# Patient Record
Sex: MALE | Race: WHITE
[De-identification: names, ages, dates, MRNs, and addresses within clinical notes are randomized per-mention and may not be internally consistent; named-entity substitution may affect disease eponyms.]

---

## 2017-12-12 ENCOUNTER — HOSPITAL ENCOUNTER (EMERGENCY)
Dept: HOSPITAL 25 - UCEAST | Age: 27
Discharge: HOME | End: 2017-12-12
Payer: COMMERCIAL

## 2017-12-12 VITALS — DIASTOLIC BLOOD PRESSURE: 71 MMHG | SYSTOLIC BLOOD PRESSURE: 122 MMHG

## 2017-12-12 DIAGNOSIS — L03.031: Primary | ICD-10-CM

## 2017-12-12 PROCEDURE — 10060 I&D ABSCESS SIMPLE/SINGLE: CPT

## 2017-12-12 PROCEDURE — G0463 HOSPITAL OUTPT CLINIC VISIT: HCPCS

## 2017-12-12 PROCEDURE — 99212 OFFICE O/P EST SF 10 MIN: CPT

## 2018-03-03 ENCOUNTER — HOSPITAL ENCOUNTER (EMERGENCY)
Dept: HOSPITAL 25 - UCEAST | Age: 28
Discharge: HOME | End: 2018-03-03
Payer: COMMERCIAL

## 2018-03-03 VITALS — DIASTOLIC BLOOD PRESSURE: 80 MMHG | SYSTOLIC BLOOD PRESSURE: 118 MMHG

## 2018-03-03 DIAGNOSIS — R51: ICD-10-CM

## 2018-03-03 DIAGNOSIS — R22.1: Primary | ICD-10-CM

## 2018-03-03 PROCEDURE — 99211 OFF/OP EST MAY X REQ PHY/QHP: CPT

## 2018-03-03 PROCEDURE — G0463 HOSPITAL OUTPT CLINIC VISIT: HCPCS

## 2018-03-03 NOTE — UC
Headache HPI





- History Of Current Complaint


Chief Complaint: Taylor


Stated Complaint: HEADACHE, AND LUMP ON NECK


Time Seen by Provider: 03/03/18 15:18


Hx Obtained From: Patient


Onset/Duration: Sudden Onset - awoke yesterday with headache and felt lump on L 

posterior neck. no headache today but lump is still there


Pain Intensity: 0


Character: Typical Headache - resolved within hours





- Allergies/Home Medications


Allergies/Adverse Reactions: 


 Allergies











Allergy/AdvReac Type Severity Reaction Status Date / Time


 


No Known Allergies Allergy   Verified 03/03/18 14:53











Home Medications: 


 Home Medications





NK [No Home Medications Reported]  03/03/18 [History Confirmed 03/03/18]











PMH/Surg Hx/FS Hx/Imm Hx


Previously Healthy: Yes





- Surgical History


Surgical History: Yes


Surgery Procedure, Year, and Place: right wrist





- Family History


Known Family History: Positive: Hypertension


   Negative: Cardiac Disease, Diabetes





- Social History


Occupation: Employed Full-time


Lives: With Family


Alcohol Use: Occasionally


Substance Use Type: None


Smoking Status (MU): Never Smoked Tobacco


Have You Smoked in the Last Year: No


Household Exposure Type: Cigarettes





- Immunization History


Most Recent Influenza Vaccination: NOT UTD


Most Recent Tetanus Shot: NOT UTD.





Review of Systems


Constitutional: Negative


Skin: Other - acne


ENT: Negative


Respiratory: Negative


Cardiovascular: Negative


Musculoskeletal: Negative


Neurological: Negative


Psychological: Negative


Is Patient Immunocompromised?: No


All Other Systems Reviewed And Are Negative: Yes





Physical Exam


Triage Information Reviewed: Yes


Appearance: Well-Appearing, No Pain Distress, Well-Nourished


Vital Signs: 


 Initial Vital Signs











Temp  99.1 F   03/03/18 14:50


 


Pulse  54   03/03/18 14:50


 


Resp  12   03/03/18 14:50


 


BP  118/80   03/03/18 14:50


 


Pulse Ox  100   03/03/18 14:50











Vital Signs Reviewed: Yes


Eye Exam: Normal


Eyes: Positive: Conjunctiva Clear


ENT Exam: Normal


Neck: Positive: Supple, Other: - one small (6mm) round, firm tender mass L 

posterior neck in hairline. pt does have some acne lesions in area which he 

explains are "normal for him"


Respiratory Exam: Normal


Respiratory: Positive: Lungs clear


Cardiovascular Exam: Normal


Cardiovascular: Positive: RRR


Musculoskeletal Exam: Normal


Neurological Exam: Normal


Neurological: Positive: Alert


Psychological Exam: Normal


Skin: Positive: Other - no erythemic vesicals, few acne lesions face and neck





Headache Course/Dx





- Differential Dx/Diagnosis


Differential Diagnosis/HQI/PQRI: Tension Headache, Other - shingles, abscess, 

LAD


Provider Diagnoses: small skin nodule posterior neck





Discharge





- Discharge Plan


Condition: Good


Disposition: HOME


Patient Education Materials:  Lymphadenopathy (ED)


Forms:  *Work Release


Referrals: 


Benny Kc MD [Primary Care Provider] - 1 Week (if no better)


Additional Instructions: 


monitor area and return if symptoms change or worsen at any time

## 2018-03-13 ENCOUNTER — HOSPITAL ENCOUNTER (EMERGENCY)
Dept: HOSPITAL 25 - UCEAST | Age: 28
Discharge: HOME | End: 2018-03-13
Payer: COMMERCIAL

## 2018-03-13 VITALS — SYSTOLIC BLOOD PRESSURE: 127 MMHG | DIASTOLIC BLOOD PRESSURE: 82 MMHG

## 2018-03-13 DIAGNOSIS — M25.571: Primary | ICD-10-CM

## 2018-03-13 PROCEDURE — G0463 HOSPITAL OUTPT CLINIC VISIT: HCPCS

## 2018-03-13 PROCEDURE — 99211 OFF/OP EST MAY X REQ PHY/QHP: CPT

## 2018-03-13 NOTE — RAD
Indication: History of tibia and fibular fracture.



3 views of the right ankle demonstrates ankle mortise to be intact. No fracture is

identified. Calcaneus is unremarkable. When compared to previous exam of August 31, 2017

previously identified tibial fracture is no longer present.



IMPRESSION: No fracture of the right ankle is noted. Prior tibial fracture appears to be

healed.

## 2018-03-13 NOTE — RAD
Indication: Right foot pain.



3 views of the right foot demonstrates no fracture. No other bone or joint abnormality is

noted.



IMPRESSION: No fracture of the right foot is noted.

## 2018-03-13 NOTE — UC
Lower Extremity/Ankle HPI





- HPI Summary


HPI Summary: 





Pt presents with right ankle pain. He tells me that about 10 months ago he 

jumped from a height and sustained an impacted fracture of his right tibia. He 

saw Orthopedics for this and healed well. Has had no issues until about 1 week 

ago. Started having right lateral malleolus pain that was at it's worst today - 

his work sent him to be seen. Denies numbness or tingling. He is able to 

ambulate without a limp and without assistance.





- History of Current Complaint


Stated Complaint: LEG PAIN FROM INJURIES


Time Seen by Provider: 03/13/18 15:49


Hx Obtained From: Patient


Onset/Duration: Gradual Onset


Severity Initially: Moderate


Severity Currently: Moderate


Pain Intensity: 6


Pain Scale Used: 0-10 Numeric


Aggravating Factor(s): Standing


Alleviating Factor(s): Rest


Able to Bear Weight: Yes





- Allergies/Home Medications


Allergies/Adverse Reactions: 


 Allergies











Allergy/AdvReac Type Severity Reaction Status Date / Time


 


No Known Allergies Allergy   Verified 03/13/18 15:51














PMH/Surg Hx/FS Hx/Imm Hx


Previously Healthy: Yes





- Surgical History


Surgical History: Yes


Surgery Procedure, Year, and Place: right wrist





- Family History


Known Family History: Positive: Hypertension


   Negative: Cardiac Disease, Diabetes





- Social History


Occupation: Employed Full-time


Lives: With Family


Alcohol Use: Occasionally


Substance Use Type: None


Smoking Status (MU): Never Smoked Tobacco


Have You Smoked in the Last Year: No


Household Exposure Type: Cigarettes





- Immunization History


Most Recent Influenza Vaccination: NOT UTD


Most Recent Tetanus Shot: NOT UTD.





Review of Systems


Constitutional: Negative


Skin: Negative


Respiratory: Negative


Cardiovascular: Negative


Neurovascular: Negative


Musculoskeletal: Other: - Pain right ankle


Neurological: Negative


Psychological: Negative


All Other Systems Reviewed And Are Negative: Yes





Physical Exam





- Summary


Physical Exam Summary: 





GENERAL: NAD. WDWN. No pain distress.


SKIN: No rashes, sores, ulcers, masses, lesions. 


NECK: Supple. Nontender. No lymphadenopathy. 


CHEST:  CTAB. No r/r/w. No accessory muscle use. Breathing comfortably and in 

no distress.


CV:  RRR. Without m/r/g. Pulses intact PT and DP. Brisk cap refill.


MSK: Mild TTP right lateral malleolus. FROM. Strength 5/5. No edema or obvious 

bony deformities. Negative talar tilt. No increased laxity. Negative Blum 

test.


NEURO: Alert. Sensations intact and symmetric B/L LEs


PSYCH: Age appropriate behavior.


Triage Information Reviewed: Yes





Lower Extremity Course/Dx





- Course


Course Of Treatment: XR: IMPRESSION: No fracture of the right foot is noted. 

IMPRESSION: No fracture of the right ankle is noted. Prior tibial fracture 

appears to be.  healed.  Declined crutches. Advised to take ibuprofen and f/u 

with Dr. Kc whom he saw for his previous fx.





- Differential Dx/Diagnosis


Provider Diagnoses: Right ankle pain





Discharge





- Discharge Plan


Condition: Stable


Disposition: HOME


Forms:  *Work Release


Referrals: 


Benyn Kc MD [Primary Care Provider] - If Needed


Additional Instructions: 


If you develop a fever, shortness of breath, chest pain, new or worsening 

symptoms - please call your PCP or go to the ED.


 


1) Please schedule a follow up appointment with Dr. Kc for further 

evaluation of your right ankle pain.

## 2018-07-30 ENCOUNTER — HOSPITAL ENCOUNTER (EMERGENCY)
Dept: HOSPITAL 25 - ED | Age: 28
Discharge: HOME | End: 2018-07-30
Payer: COMMERCIAL

## 2018-07-30 VITALS — DIASTOLIC BLOOD PRESSURE: 65 MMHG | SYSTOLIC BLOOD PRESSURE: 124 MMHG

## 2018-07-30 DIAGNOSIS — S61.242A: Primary | ICD-10-CM
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
## 2018-07-30 PROCEDURE — 99282 EMERGENCY DEPT VISIT SF MDM: CPT

## 2018-07-30 PROCEDURE — 90715 TDAP VACCINE 7 YRS/> IM: CPT

## 2018-07-30 PROCEDURE — 90471 IMMUNIZATION ADMIN: CPT

## 2018-07-30 NOTE — ED
Upper Extremity Pain





- HPI Summary


HPI Summary: 





Complains of fishhook in finger tip pad of right third digit today.  Denies 

loss of sensation or function.  No anti-coag.  Medical history is none.





- History of Current Complaint


Chief Complaint: EDExtremityUpper


Stated Complaint: RT FINGER-HOOK IN MIDDLE FINGER


Time Seen by Provider: 07/30/18 19:37


Hx Obtained From: Patient


Severity Initially: Moderate


Severity Currently: Moderate


Pain Location: Finger


Character: Sharp, Aching, Throbbing


Aggravating Factor(s): Movement





- Allergies/Home Medications


Allergies/Adverse Reactions: 


 Allergies











Allergy/AdvReac Type Severity Reaction Status Date / Time


 


No Known Allergies Allergy   Verified 03/13/18 15:51














PMH/Surg Hx/FS Hx/Imm Hx


Endocrine/Hematology History: 


   Denies: Hx Anticoagulant Therapy, Hx Diabetes, Hx Thyroid Disease


Cardiovascular History: 


   Denies: Hx Hypertension


Respiratory History: 


   Denies: Hx Asthma, Hx Chronic Obstructive Pulmonary Disease (COPD)


GI History: 


   Denies: Hx Ulcer


 History: 


   Denies: Hx Dialysis


Neurological History: 


   Denies: Hx CVA


Psychiatric History: Reports: Hx Anxiety - hx of cutting, Hx Substance Abuse





- Cancer History


Cancer Type, Location and Year: anxiety





- Surgical History


Surgery Procedure, Year, and Place: right wrist





- Immunization History


Date of Tetanus Vaccine: 07/30/18


Infectious Disease History: No


Infectious Disease History: 


   Denies: Hx Clostridium Difficile, Hx Hepatitis, Hx Human Immunodeficiency 

Virus (HIV), Hx of Known/Suspected MRSA, Hx Shingles, Hx Tuberculosis, Hx Known/

Suspected VRE, Hx Known/Suspected VRSA, History Other Infectious Disease, 

Traveled Outside the [place] in Last 30 Days





- Family History


Known Family History: Positive: Hypertension


   Negative: Cardiac Disease, Diabetes





- Social History


Alcohol Use: Occasionally


Hx Substance Use: Yes


Substance Use Type: Reports: None


Smoking Status (MU): Never Smoked Tobacco


Have You Smoked in the Last Year: No





Review of Systems


Constitutional: Negative


Eyes: Negative


ENT: Negative


Cardiovascular: Negative


Respiratory: Negative


Gastrointestinal: Negative


Genitourinary: Negative


Musculoskeletal: Negative


Skin: Other


Neurological: Negative


Psychological: Normal


All Other Systems Reviewed And Are Negative: Yes





Physical Exam





- Summary


Physical Exam Summary: 





[place] from trouble hook lodged in superficial fingertip pad of the right 

third digit.  PMS intact distally


Triage Information Reviewed: Yes


Vital Signs On Initial Exam: 


 Initial Vitals











Temp Pulse Resp BP Pulse Ox


 


 99.2 F   53   18   139/76   100 


 


 07/30/18 19:14  07/30/18 19:14  07/30/18 19:14  07/30/18 19:14  07/30/18 19:14











Vital Signs Reviewed: Yes


Appearance: Positive: Well-Appearing


Skin: Positive: Warm


Head/Face: Positive: Normal Head/Face Inspection


Eyes: Positive: Normal


Neck: Positive: Supple


Respiratory/Lung Sounds: Positive: Clear to Auscultation


Cardiovascular: Positive: Normal


Abdomen Description: Positive: Nontender


Musculoskeletal: Positive: Normal


Neurological: Positive: Normal


Psychiatric: Positive: Normal


AVPU Assessment: Alert





- [place] Coma Scale


Best Eye Response: 4 - Spontaneous


Best Motor Response: 6 - Obeys Commands


Best Verbal Response: 5 - Oriented


Coma Scale Total: 15





Diagnostics





- Vital Signs


 Vital Signs











  Temp Pulse Resp BP Pulse Ox


 


 07/30/18 19:14  99.2 F  53  18  139/76  100














- Laboratory


Lab Statement: Any lab studies that have been ordered have been reviewed, and 

results considered in the medical decision making process.





Course/Dx





- Course


Course Of Treatment: Complains of fishhook in finger tip pad of right third 

digit today.  Denies loss of sensation or function.  No anti-coag.  Medical 

history is none.  [place] from trouble hook lodged in superficial fingertip 

pad of the right third digit.  PMS intact distally.  Booster shot given.  

[place] removed successfully.  Patient started on Keflex here in the ED.  Rx 

for same.  Ibuprofen for pain.





- Diagnoses


Provider Diagnoses: 


 Fish hook injury of finger








Discharge





- Sign-Out/Discharge


Documenting (check all that apply): Patient Departure





- Discharge Plan


Condition: Stable


Disposition: HOME


Prescriptions: 


Cephalexin CAP* [Keflex CAP*] 500 mg PO TID 7 Days #21 cap


Patient Education Materials:  Soft Tissue Foreign Body (ED)


Referrals: 


Benny Kc MD [Primary Care Provider] - 


Additional Instructions: 


Take antibiotics as directed.  May wash wound with warm running water and soap.

  Keep protected when not washing.  Do not submerge underwater as in swimming 

for 4 days.  Return to the ED for any new or worsening symptoms





- Billing Disposition and Condition


Condition: STABLE


Disposition: Home

## 2018-08-28 ENCOUNTER — HOSPITAL ENCOUNTER (EMERGENCY)
Dept: HOSPITAL 25 - UCEAST | Age: 28
Discharge: HOME | End: 2018-08-28
Payer: COMMERCIAL

## 2018-08-28 VITALS — SYSTOLIC BLOOD PRESSURE: 132 MMHG | DIASTOLIC BLOOD PRESSURE: 78 MMHG

## 2018-08-28 DIAGNOSIS — S29.011A: ICD-10-CM
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
## 2018-08-28 DIAGNOSIS — R03.0: ICD-10-CM

## 2018-08-28 DIAGNOSIS — R07.89: Primary | ICD-10-CM
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
## 2018-08-28 PROCEDURE — 71046 X-RAY EXAM CHEST 2 VIEWS: CPT

## 2018-08-28 PROCEDURE — 93005 ELECTROCARDIOGRAM TRACING: CPT

## 2018-08-28 PROCEDURE — G0463 HOSPITAL OUTPT CLINIC VISIT: HCPCS

## 2018-08-28 PROCEDURE — 99211 OFF/OP EST MAY X REQ PHY/QHP: CPT

## 2018-08-28 NOTE — UC
Cardiac HPI





- History of Current Complaint


Stated Complaint: CHEST PAIN


Time Seen by Provider: 08/28/18 18:16





- Allergy/Home Medications


Allergies/Adverse Reactions: 


 Allergies











Allergy/AdvReac Type Severity Reaction Status Date / Time


 


No Known Allergies Allergy   Verified 03/13/18 15:51














PMH/Surg Hx/FS Hx/Imm Hx


Other History Of: 


   Negative For: Anticoagulant Therapy





- Surgical History


Surgical History: Yes


Surgery Procedure, Year, and Place: right wrist





- Family History


Known Family History: Positive: Hypertension


   Negative: Cardiac Disease, Diabetes





- Social History


Alcohol Use: Occasionally


Substance Use Type: None


Smoking Status (MU): Never Smoked Tobacco


Have You Smoked in the Last Year: No


Household Exposure Type: Cigarettes





- Immunization History


Most Recent Influenza Vaccination: NOT UTD


Most Recent Tetanus Shot: NOT UTD.





Discharge





- Discharge Plan


Referrals: 


Benny Kc MD [Primary Care Provider] -

## 2018-08-28 NOTE — UC
Cardiac HPI





- HPI Summary


HPI Summary: 





27 yo male presents with intermittent left sided chest pain over the last 3 

weeks. He tells me that at work he does a lot of heavy and overhead lifting. He 

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 

the last 3 weeks has felt "pulls" and "aches" in his left anterior chest. 

Thinks it may be a muscle. Says that he can reproduce his pain if he "pushes on 

it hard enough". He has not taken anything OTC for this discomfort. Pain does 

not change with meals. Denies fever, chills, SOB, AMARO, palpitations, cough, 

abdominal pain, n/v/d/c. No cardiac fam hx except HTN.





- History of Current Complaint


Stated Complaint: CHEST PAIN


Time Seen by Provider: 08/28/18 18:16


Hx Obtained From: Patient


Onset/Duration: Gradual Onset


Timing: Intermittent Episodes Lasting: - minutes


Initial Severity: Mild


Current Severity: Mild


Pain Intensity: 2


Chest Pain Location: Left Anterior


Character: Sharp/Stabbing





- Allergy/Home Medications


Allergies/Adverse Reactions: 


 Allergies











Allergy/AdvReac Type Severity Reaction Status Date / Time


 


No Known Allergies Allergy   Verified 03/13/18 15:51














PMH/Surg Hx/FS Hx/Imm Hx





- Additional Past Medical History


Additional PMH: 





None


Other History Of: 


   Negative For: Anticoagulant Therapy





- Surgical History


Surgical History: Yes


Surgery Procedure, Year, and Place: right wrist





- Family History


Known Family History: Positive: Hypertension


   Negative: Cardiac Disease, Diabetes





- Social History


Occupation: Employed Full-time


Lives: With Family


Alcohol Use: Occasionally


Substance Use Type: None


Smoking Status (MU): Never Smoked Tobacco


Have You Smoked in the Last Year: No


Household Exposure Type: Cigarettes





- Immunization History


Most Recent Influenza Vaccination: NOT UTD


Most Recent Tetanus Shot: NOT UTD.





Review of Systems


Constitutional: Negative


Skin: Negative


Respiratory: Negative


Cardiovascular: Chest Pain


Gastrointestinal: Negative


Neurovascular: Negative


Neurological: Negative


Psychological: Negative


All Other Systems Reviewed And Are Negative: Yes





Physical Exam





- Summary


Physical Exam Summary: 





GENERAL: NAD. WDWN. No pain distress.


SKIN: No rashes, sores, lesions, or open wounds.


NECK: Supple. Nontender. No lymphadenopathy. 


CHEST:  CTAB. No r/r/w. No accessory muscle use. Breathing comfortably and in 

no distress.


CV:  RRR. Without m/r/g. Pulses intact. Cap refill <2seconds


ABDOMEN:  Soft. NTTP. No distention or guarding. No CVA tenderness. Bowel 

sounds present


MSK: FROM b/l UEs with mild reproduction of pain with left shoulder adduction 

against force. Left anterior chest NTTP.


NEURO: Alert. CN II-XII grossly intact.


PSYCH: Age appropriate behavior.


Triage Information Reviewed: Yes


Vital Signs: 





Vital Signs:











Temp Pulse Resp BP Pulse Ox


 


 978.0 F   60   16   132/78   96 


 


 08/28/18 18:18  08/28/18 18:18  08/28/18 18:18  08/28/18 18:18  08/28/18 18:18











Vital Signs Reviewed: Yes





- Assessment/Plan


Course Of Treatment: EKG sinus bradycardia at 56 bpm. No ST changes as read by 

Dr. Ross. Compared to EKG from 5/25/16.  CXR obtained, no radiologist 

reading after 1800 therefore wet read is negative for acute process. I suspect 

his pain is due to a muscle strain and have advised him to try OTC ibuprofen 

and apply heat to the area to see if this improves his pain. If he develops any 

worsening or new symptoms to be further evaluated in the ED. Pt was agreeable 

to this.





- Clinical Impression


Provider Diagnoses: Chest pain.  Muscle strain





Discharge





- Sign-Out/Discharge


Documenting (check all that apply): Patient Departure


All imaging exams completed and their final reports reviewed: No





- Discharge Plan


Condition: Stable


Disposition: HOME


Patient Education Materials:  Muscle Strain (DC)


Referrals: 


Benny Kc MD [Primary Care Provider] - 


Additional Instructions: 


If you develop a fever, shortness of breath, chest pain, new or worsening 

symptoms - please call your PCP or go to the ED.


 





Your blood pressure was mildly elevated at todays visit. Please see your 

primary provider within 4 weeks for recheck and re-evaluation.








1) Please try over-the-counter ibuprofen 600mg every 6-8hours as needed for 

pain to see if this improves your pain.


2) If you develop shortness of breath, increasing chest pain, or dizziness - 

please call 911 or go to the ER.





- Billing Disposition and Condition


Condition: STABLE


Disposition: Home





- Attestation Statements


Provider Attestation: 





Per institutional requirements, I have reviewed the chart, however, I was not 

consulted specifically or made aware of this patient by the midlevel provider.  

I did not personally evaluate, interact with , or disposition  this patient.

## 2018-08-29 NOTE — RAD
INDICATION: LEFT anterior upper dull chest pain for 2 weeks.



COMPARISON: May 25, 2016



TECHNIQUE:  Dual energy PA and routine lateral views of the chest were obtained.



REPORT: Lung volumes appear elevated. No focal pulmonary lesion, compelling alveolar

consolidation, pleural effusion, pneumothorax. The heart, pulmonary vasculature, and

mediastinal contours are unremarkable. Unremarkable soft tissue contours and osseous

structures. 



IMPRESSION: 

#. Elevated lung volumes may reflect obstructive lung disease or simply exuberant

inspiratory effort for examination. 

#. No evidence for acute intrathoracic disease.



R0

## 2018-08-30 ENCOUNTER — HOSPITAL ENCOUNTER (EMERGENCY)
Dept: HOSPITAL 25 - ED | Age: 28
Discharge: HOME | End: 2018-08-30
Payer: COMMERCIAL

## 2018-08-30 VITALS — SYSTOLIC BLOOD PRESSURE: 141 MMHG | DIASTOLIC BLOOD PRESSURE: 84 MMHG
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
## 2018-08-30 DIAGNOSIS — S01.04XA: Primary | ICD-10-CM
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
## 2018-08-30 PROCEDURE — 99281 EMR DPT VST MAYX REQ PHY/QHP: CPT

## 2018-08-30 NOTE — ED
Skin Complaint





- HPI Summary


HPI Summary: 


This patient is a 28 year old M presenting to [facility] with a chief complaint of a 

fishing lure in scalp that began at approximately at 1730. The patient rates 

the pain 0/10 in severity. Symptoms aggravated by nothing. Symptoms alleviated 

by nothing.








- History of Current Complaint


Chief Complaint: EDGeneral


Time Seen by Provider: 08/30/18 19:28


Stated Complaint: FISHING HOOK IN BACK OF HEAD


Hx Obtained From: Patient


Onset/Duration: Started Hours Ago, Still Present


Skin Exposure Onset/Duration: Hours Ago


Timing: Constant


Onset Severity: Mild


Current Severity: Mild


Pain Intensity: 0


Pain Scale Used: 0-10 Numeric


Skin Location: Other: - Back of head


Aggravating Symptom(s): Nothing


Alleviating Symptom(s): Nothing





- Allergy/Home Medications


Allergies/Adverse Reactions: 


 Allergies











Allergy/AdvReac Type Severity Reaction Status Date / Time


 


No Known Allergies Allergy   Verified 08/30/18 19:24














PMH/Surg Hx/FS Hx/Imm Hx


Previously Healthy: No


Endocrine/Hematology History: 


   Denies: Hx Anticoagulant Therapy, Hx Diabetes, Hx Thyroid Disease


Cardiovascular History: 


   Denies: Hx Hypertension


Respiratory History: 


   Denies: Hx Asthma, Hx Chronic Obstructive Pulmonary Disease (COPD)


GI History: 


   Denies: Hx Ulcer


 History: 


   Denies: Hx Dialysis


Neurological History: 


   Denies: Hx CVA


Psychiatric History: Reports: Hx Anxiety - hx of cutting, Hx Substance Abuse





- Cancer History


Cancer Type, Location and Year: anxiety





- Surgical History


Surgery Procedure, Year, and Place: right wrist





- Immunization History


Date of Tetanus Vaccine: 07/30/18


Infectious Disease History: No


Infectious Disease History: 


   Denies: Hx Clostridium Difficile, Hx Hepatitis, Hx Human Immunodeficiency 

Virus (HIV), Hx of Known/Suspected MRSA, Hx Shingles, Hx Tuberculosis, Hx Known/

Suspected VRE, Hx Known/Suspected VRSA, History Other Infectious Disease, 

Traveled Outside the [place] in Last 30 Days





- Family History


Known Family History: Positive: Hypertension


   Negative: Cardiac Disease, Diabetes





- Social History


Occupation: Employed Full-time


Lives: With Family


Alcohol Use: Occasionally


Hx Substance Use: No


Substance Use Type: Reports: None


Hx Tobacco Use: No


Smoking Status (MU): Never Smoked Tobacco


Have You Smoked in the Last Year: No





Review of Systems


Negative: Fever


Positive: Other - Positive fishing lure in scalp


All Other Systems Reviewed And Are Negative: Yes





Physical Exam





- Summary


Physical Exam Summary: 


Appearance: Well-appearing, Well-nourished, lying in bed comfortable


Skin: Warm, dry, no obvious rash. Fishing lure one vivien stuck in the occipital 

scalp


Eyes: sclera anicteric, no conjunctival pallor


ENT: mucous membranes moist


Neck: deferred


Respiratory: No signs of respiratory distress


Cardiovascular: Appears well perfused, pulses are nml


Abdomen: deferred


Musculoskeletal: Moving all 4 extremities without obvious discomfort


Neurological: Awake and alert, mentation is normal, speech is fluent and 

appropriate


Psychiatric: affect is normal, does not appear anxious or depressed





Triage Information Reviewed: Yes


Vital Signs On Initial Exam: 


 Initial Vitals











Temp Pulse Resp BP Pulse Ox


 


 98.6 F   98   16   141/84   97 


 


 08/30/18 19:20  08/30/18 19:20  08/30/18 19:20  08/30/18 19:20  08/30/18 19:20











Vital Signs Reviewed: Yes





Procedures





- Procedure Summary


Procedure Summary: 





Procedure: Removal foreign body skin


Narrative: The fishing lure was grasped and the embedded vivien was gently 

advanced through the skin. The vivien was then snipped off using needle cutter, 

after which the hook was easily removed.





Diagnostics





- Vital Signs


 Vital Signs











  Temp Pulse Resp BP Pulse Ox


 


 08/30/18 19:20  98.6 F  98  16  141/84  97














- Laboratory


Lab Statement: Any lab studies that have been ordered have been reviewed, and 

results considered in the medical decision making process.





Course/Dx





- Diagnoses


Provider Diagnoses: 


 Foreign body (FB) in soft tissue








Discharge





- Sign-Out/Discharge


Documenting (check all that apply): Patient Departure





- Discharge Plan


Condition: Improved


Disposition: HOME


Patient Education Materials:  Soft Tissue Foreign Body (ED)


Referrals: 


Benny Kc MD [Primary Care Provider] - If Needed


Additional Instructions: 


Shampoo when you get home to clean the area. Infections are fairly rare on the 

scalp, but if it starts to look very swollen, red or pussy come back so we can 

take a look.





- Billing Disposition and Condition


Condition: IMPROVED


Disposition: Home





- Attestation Statements


Document Initiated by Scribe: Yes


Documenting Scribe: Tracie Mccrary


Provider For Whom Scribe is Documenting (Include Credential): Joby Das MD


Scribe Attestation: 


I, Tracie Mccrary, scribed for Joby Das MD on 08/30/18 at 1944. 


Scribe Documentation Reviewed: Yes


Provider Attestation: 


The documentation as recorded by the scribe, Tracie Mccrary accurately reflects the 

service I personally performed and the decisions made by me, Joby Das MD

## 2018-09-18 ENCOUNTER — HOSPITAL ENCOUNTER (EMERGENCY)
Dept: HOSPITAL 25 - ED | Age: 28
Discharge: HOME | End: 2018-09-18
Payer: COMMERCIAL

## 2018-09-18 VITALS — SYSTOLIC BLOOD PRESSURE: 131 MMHG | DIASTOLIC BLOOD PRESSURE: 70 MMHG

## 2018-09-18 DIAGNOSIS — J03.90: Primary | ICD-10-CM

## 2018-09-18 PROCEDURE — 99282 EMERGENCY DEPT VISIT SF MDM: CPT

## 2018-09-18 PROCEDURE — 87651 STREP A DNA AMP PROBE: CPT

## 2018-09-18 PROCEDURE — 36415 COLL VENOUS BLD VENIPUNCTURE: CPT

## 2018-09-18 PROCEDURE — 86308 HETEROPHILE ANTIBODY SCREEN: CPT

## 2018-09-18 NOTE — ED
Throat Pain/Nasal Congestion





- HPI Summary


HPI Summary: 


Patient is a 28-year-old male who presents emergency department for sore throat 

that started yesterday.  Denies associated symptoms of fever, chills, nausea, 

vomiting, abdominal pain, cough.  Symptoms are mild in severity.  Swallowing 

and eating makes symptoms worse.  Nothing makes symptoms better.  He has no 

significant past medical history.  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 

[employer].








- History of Current Complaint


Chief Complaint: EDThroatPain


Time Seen by Provider: 09/18/18 16:06


Hx Obtained From: Patient





- Allergies/Home Medications


Allergies/Adverse Reactions: 


 Allergies











Allergy/AdvReac Type Severity Reaction Status Date / Time


 


No Known Allergies Allergy   Verified 08/30/18 19:24











Home Medications: 


 Home Medications





NK [No Home Medications Reported]  09/18/18 [History Confirmed 09/18/18]











PMH/Surg Hx/FS Hx/Imm Hx


Previously Healthy: Yes


Endocrine/Hematology History: 


   Denies: Hx Anticoagulant Therapy, Hx Diabetes, Hx Thyroid Disease


Cardiovascular History: 


   Denies: Hx Hypertension


Respiratory History: 


   Denies: Hx Asthma, Hx Chronic Obstructive Pulmonary Disease (COPD)


GI History: 


   Denies: Hx Ulcer


 History: 


   Denies: Hx Dialysis


Neurological History: 


   Denies: Hx CVA


Psychiatric History: Reports: Hx Anxiety - hx of cutting, Hx Substance Abuse





- Cancer History


Cancer Type, Location and Year: anxiety





- Surgical History


Surgery Procedure, Year, and Place: right wrist





- Immunization History


Date of Tetanus Vaccine: 07/30/18


Infectious Disease History: No


Infectious Disease History: 


   Denies: Hx Clostridium Difficile, Hx Hepatitis, Hx Human Immunodeficiency 

Virus (HIV), Hx of Known/Suspected MRSA, Hx Shingles, Hx Tuberculosis, Hx Known/

Suspected VRE, Hx Known/Suspected VRSA, History Other Infectious Disease, 

Traveled Outside the [place] in Last 30 Days





- Family History


Known Family History: Positive: Hypertension


   Negative: Cardiac Disease, Diabetes





- Social History


Occupation: Employed Full-time


Lives: With Family


Alcohol Use: Occasionally


Hx Substance Use: No


Substance Use Type: Reports: None


Hx Tobacco Use: No


Smoking Status (MU): Never Smoked Tobacco


Have You Smoked in the Last Year: No





Review of Systems


Constitutional: Negative


Negative: Fever, Chills


Positive: Photophobia


Positive: Sore Throat


Cardiovascular: Negative


Respiratory: Negative


Gastrointestinal: Negative


Negative: Abdominal Pain, Vomiting, Diarrhea


Skin: Negative


Neurological: Negative


All Other Systems Reviewed And Are Negative: Yes





Physical Exam


Triage Information Reviewed: Yes


Vital Signs On Initial Exam: 


 Initial Vitals











Temp Pulse Resp BP Pulse Ox


 


 98.5 F   73   16   148/83   97 


 


 09/18/18 15:57  09/18/18 15:57  09/18/18 15:57  09/18/18 15:57  09/18/18 15:57











Vital Signs Reviewed: Yes


Appearance: Positive: Well-Appearing - Patient sitting on bed in no acute 

distress.


Skin: Positive: Warm, Dry


Head/Face: Positive: Normal Head/Face Inspection


Eyes: Positive: Normal, EOMI


ENT: Positive: TMs normal, Other - Oropharynx injected with bilateral tonsillar 

edema without exudates.  Uvula is midline without deviation or edema.  No 

muffled voice or pooling of secretions.


Neck: Positive: Supple, Nontender, Enlarged Nodes @ - right cervical


Respiratory/Lung Sounds: Positive: Clear to Auscultation, Breath Sounds Present


Cardiovascular: Positive: Normal, RRR


Neurological: Positive: Normal, CN Intact II-III


Psychiatric: Positive: Affect/Mood Appropriate





Diagnostics





- Vital Signs


 Vital Signs











  Temp Pulse Resp BP Pulse Ox


 


 09/18/18 15:57  98.5 F  73  16  148/83  97














- Laboratory


Lab Statement: Any lab studies that have been ordered have been reviewed, and 

results considered in the medical decision making process.





EENT Course/Dx





- Course


Course Of Treatment: Pt. presenting for a sore throat. He is afebrile and well 

appearing. Strep and mono screen negative. Pt. concerned with swelling to the 

right side of his neck, he was reassured this is reactive lymphadenopathy. 

Suspect viral etiology. Will treat concervatively. Advised tylenol or motrin 

for pain as directed. Increase fluids and rest. Warm tea and cough drops. To 

f.u with the [facility] clinic and return to ER if sxs change or worsen.





- Differential Diagnoses


Differential Diagnoses: Periodontic Abscess, Pharyngitis, Sinusitis, Tonsilitis





- Diagnoses


Provider Diagnoses: 


 Acute tonsillitis








Discharge





- Sign-Out/Discharge


Documenting (check all that apply): Patient Departure





- Discharge Plan


Condition: Good


Disposition: HOME


Patient Education Materials:  Pharyngitis (ED)


Forms:  *Work Release


Referrals: 


[facility] [Outside]


Additional Instructions: 


Schedule a follow up appointment with your PCP


Increase fluids and rest


Tylenol or Motrin for pain as directed


Return to ER if symptoms change or worsen





- Billing Disposition and Condition


Condition: GOOD


Disposition: Home

## 2019-02-23 ENCOUNTER — HOSPITAL ENCOUNTER (EMERGENCY)
Dept: HOSPITAL 25 - ED | Age: 29
LOS: 1 days | Discharge: HOME | End: 2019-02-24
Payer: COMMERCIAL

## 2019-02-23 DIAGNOSIS — F10.129: Primary | ICD-10-CM

## 2019-02-23 DIAGNOSIS — R45.1: ICD-10-CM

## 2019-02-23 DIAGNOSIS — F12.10: ICD-10-CM

## 2019-02-23 PROCEDURE — 96374 THER/PROPH/DIAG INJ IV PUSH: CPT

## 2019-02-23 PROCEDURE — 81003 URINALYSIS AUTO W/O SCOPE: CPT

## 2019-02-23 PROCEDURE — 80307 DRUG TEST PRSMV CHEM ANLYZR: CPT

## 2019-02-23 PROCEDURE — 36415 COLL VENOUS BLD VENIPUNCTURE: CPT

## 2019-02-23 PROCEDURE — 99285 EMERGENCY DEPT VISIT HI MDM: CPT

## 2019-02-23 PROCEDURE — 70450 CT HEAD/BRAIN W/O DYE: CPT

## 2019-02-23 RX ADMIN — KETAMINE HYDROCHLORIDE ONE MG: 50 INJECTION INTRAMUSCULAR; INTRAVENOUS at 23:22

## 2019-02-23 NOTE — ED
Substance Abuse/Use





- HPI Summary


HPI Summary: 


Pt is a 28 y/o male brought in by police on a 941 who presents to the ED c/o 

alcohol intoxication. As per police, he was called in for a domestic dispute. 

Pt has been drinking an unknown amount of alcohol and potentially has other 

substances in his system. He was smashing his head into the patrol car window 

in an attempt to harm himself. Pt states he is not a threat to others. He is 

agitated in the room. PMHx anxiety, self-harm, and substance abuse. Pt is a 

level 5 caveat due to his intoxication.





- History Of Current Complaint


Chief Complaint: EDSubstanceAbuse


Stated Complaint: 941


Time Seen by Provider: 02/23/19 22:59


Hx Obtained From: Patient, EMS


Hx From Patient Unobtainable Due To: Other - Intoxication


Ingestion History: Type/Name Of Drug - Alcohol, possible other substances, 

Amount Ingested - Unknown


Character: Angry


Aggravating Factor(s): Nothing


Alleviating Factor(s): Nothing


Associated Signs And Symptoms: Hostile





- Allergies/Home Medications


Allergies/Adverse Reactions: 


 Allergies











Allergy/AdvReac Type Severity Reaction Status Date / Time


 


No Known Allergies Allergy   Verified 08/30/18 19:24














PMH/Surg Hx/FS Hx/Imm Hx


Endocrine/Hematology History: 


   Denies: Hx Anticoagulant Therapy, Hx Diabetes, Hx Thyroid Disease


Cardiovascular History: 


   Denies: Hx Hypertension


Respiratory History: 


   Denies: Hx Asthma, Hx Chronic Obstructive Pulmonary Disease (COPD)


GI History: 


   Denies: Hx Ulcer


 History: 


   Denies: Hx Dialysis


Neurological History: 


   Denies: Hx CVA


Psychiatric History: Reports: Hx Anxiety - hx of cutting, Hx Substance Abuse





- Cancer History


Cancer Type, Location and Year: anxiety





- Surgical History


Surgery Procedure, Year, and Place: right wrist





- Immunization History


Date of Tetanus Vaccine: 07/30/18


Infectious Disease History: Unable to Obtain/Confirm


Infectious Disease History: 


   Denies: Hx Clostridium Difficile, Hx Hepatitis, Hx Human Immunodeficiency 

Virus (HIV), Hx of Known/Suspected MRSA, Hx Shingles, Hx Tuberculosis, Hx Known/

Suspected VRE, Hx Known/Suspected VRSA, History Other Infectious Disease, 

Traveled Outside the [place] in Last 30 Days





- Family History


Known Family History: Positive: Hypertension


   Negative: Cardiac Disease, Diabetes





- Social History


Alcohol Use: Occasionally


Hx Substance Use: No


Substance Use Type: Reports: None


Substance Use Comment - Amount & Last Used: today


Hx Tobacco Use: No


Smoking Status (MU): Never Smoked Tobacco


Have You Smoked in the Last Year: No





Review of Systems


Positive: Other - Agitation


All Other Systems Reviewed And Are Negative: No





Physical Exam





- Summary


Physical Exam Summary: 


Appearance: Well appearing, no pain distress


Skin: warm, dry, reflects adequate perfusion, erythema on forehead


Head/face: normal


Eyes: EOMI, ZEB, conjunctiva injected


ENT: mucous membranes moist


Neck: supple, non-tender


Respiratory: CTA, breath sounds present


Cardiovascular: RRR, pulses symmetrical 


Abdomen: non-tender, soft


Bowel Sounds: present


Musculoskeletal: normal, strength/ROM intact


Neuro: normal, sensory motor intact, A&Ox3


Psych: agitated, screaming obscenities, threatening to head-butt people, 

intoxicated with unknown substance(s)





Physical exam is limited by intoxication - LEVEL 5 CAVEAT


Triage Information Reviewed: Yes


Vital Signs On Initial Exam: 


 Initial Vitals











Temp Pulse Resp BP Pulse Ox


 


 99.1 F   95   16   144/85   95 


 


 02/23/19 22:50  02/23/19 22:50  02/23/19 22:50  02/23/19 22:50  02/23/19 22:50











Vital Signs Reviewed: Yes





Diagnostics





- Vital Signs


 Vital Signs











  Temp Pulse Resp BP Pulse Ox


 


 02/23/19 22:50  99.1 F  95  16  144/85  95














- Laboratory


Lab Statement: Any lab studies that have been ordered have been reviewed, and 

results considered in the medical decision making process.





- CT


  ** Brain CT


CT Interpretation Completed By: Radiologist


Summary of CT Findings: No acute intracranial abnormality. ED physician 

reviewed radiology report.





Re-Evaluation





- Re-Evaluation


  ** First Eval


Re-Evaluation Time: 23:04


Change: Unchanged


Comment: Pt is agitated and aggressive so he was given 250 mg Ketamine with 

soft restraints.





  ** Second Eval


Re-Evaluation Time: 03:34


Comment: Patient is awake at this time, still intoxicated and with unsteady 

gait. He states that he wants to leave. Patient was informed he cannot as he is 

still intoxicated. After discussion, patient is agreeable with staying in ED 

until sober.





  ** Third Eval


Re-Evaluation Time: 04:29


Comment: Patient is agitated, uncooperative, yelling, and cursing in ED, 

stating that he wants to leave. He is still intoxicated. Patient to be sedated, 

250 mg ketamine and restrained.





Course/Dx





- Course


Course Of Treatment: Nurse's notes reviewed.  Patient presents with 

intoxication and aggression by police.  He admits to alcohol and marijuana 

tonight.  He is yelling obscenities and aggressive with the staff and police.  

He required restraint both physically and chemically with ketamine.  Restraints 

were then removed quickly.  He awakens sometime thereafter and was able to walk 

to the restroom.  He again became aggressive and verbally disruptive.  He 

required a second round of for straight with ketamine once again.  He is 

beginning to arouse at time of shift change when he is signed out to oncoming 

ER physician.





- Diagnoses


Differential Diagnosis/HQI/PQRI: Positive: Acute Psychosis, Alcohol Abuse, Drug 

Abuse


Provider Diagnoses: 


 Alcohol intoxication, Cannabis abuse, Agitation








- Critical Care Time


Critical Care Time: 30-74 min - CCT is EXCLUSIVE of separately billable 

procedures.





Discharge





- Sign-Out/Discharge


Documenting (check all that apply): Sign-Out Patient


Signing out patient TO: Joby Gooden


Patient Received Moderate/Deep Sedation with Procedure: No





- Discharge Plan


Condition: Stable


Disposition: HOME


Patient Education Materials:  Alcohol Intoxication (ED)


Referrals: 


Benny Kc MD [Primary Care Provider] - 


Additional Instructions: 


Never drink to excess.  Do not use street drugs.  Do not drive today.  Return 

if worse, new symptoms or other concerns.  Handouts for local resources to help 

with your drinking were provided.





- Billing Disposition and Condition


Condition: STABLE


Disposition: Home





- Attestation Statements


Document Initiated by Scribe: Yes


Documenting Scribe: Erendira Palomares 


Provider For Whom Sara is Documenting (Include Credential): Angelo Nicole MD


Scribe Attestation: 


Erendira HUMMEL , scribed for Angelo Nicole MD on 02/24/19 at 

0715. 


Scribe Documentation Reviewed: Yes


Provider Attestation: 


The documentation as recorded by the [email or web address], Erendira Palomares  

accurately reflects the service I personally performed and the decisions made 

by me, Angelo Nicole MD


Status of Scribe Document: Viewed

## 2019-02-24 VITALS — SYSTOLIC BLOOD PRESSURE: 153 MMHG | DIASTOLIC BLOOD PRESSURE: 84 MMHG

## 2019-02-24 RX ADMIN — KETAMINE HYDROCHLORIDE ONE MG: 50 INJECTION INTRAMUSCULAR; INTRAVENOUS at 04:50

## 2019-02-24 NOTE — ED
Progress





- Progress Note


Progress Note: 





The patient was signed out by Dr. Nicole to Dr. Gooden, pending sobriety.





Re-Evaluation





- Re-Evaluation


  ** First Eval


Re-Evaluation Time: 23:04


Change: Unchanged


Comment: Pt is agitated and aggressive so he was given 250 mg Ketamine with 

soft restraints.





  ** Second Eval


Re-Evaluation Time: 03:34


Comment: Patient is awake at this time, still intoxicated and with unsteady 

gait. He states that he wants to leave. Patient was informed he cannot as he is 

still intoxicated. After discussion, patient is agreeable with staying in ED 

until sober.





  ** Third Eval


Re-Evaluation Time: 04:29


Comment: Patient is agitated, uncooperative, yelling, and cursing in ED, 

stating that he wants to leave. He is still intoxicated. Patient to be sedated, 

250 mg ketamine and restrained.





Course/Dx





- Course


Course Of Treatment: Mr. Trujillo was being discharged at the beginning of my 

shift at 7 AM.  He had come in intoxicated and belligerent and ended up 

receiving ketamine a couple of times.  When I first saw him at 7 AM he was 

awake and again angry and belligerent.  What it boiled down to was that he 

upset about having been given the medication and where it was making him feel.  

We kept him and watched him for a couple more hours until he felt a little 

better and was willing to be discharged.  He was up and ambulating about and 

clearly clinically sober.





- Diagnoses


Provider Diagnoses: 


 Alcohol intoxication, Cannabis abuse, Agitation








- Critical Care Time


Critical Care Time: 30-74 min - CCT is EXCLUSIVE of separately billable 

procedures.





Discharge





- Sign-Out/Discharge


Documenting (check all that apply): Patient Departure - discharge, Receiving 

Sign-Out


Receiving patient FROM: Angelo Nicole


Patient Received Moderate/Deep Sedation with Procedure: No





- Discharge Plan


Condition: Stable


Disposition: HOME


Patient Education Materials:  Alcohol Intoxication (ED)


Referrals: 


Benny Kc MD [Primary Care Provider] - 


Additional Instructions: 


Never drink to excess.  Do not use street drugs.  Do not drive today.  Return 

if worse, new symptoms or other concerns.  Handouts for local resources to help 

with your drinking were provided.





- Billing Disposition and Condition


Condition: STABLE


Disposition: Home





- Attestation Statements


Document Initiated by Scribe: Yes


Documenting Scribe: Andrew Navarro


Provider For Whom Scribe is Documenting (Include Credential): Joby Gooden MD


Scribe Attestation: 


I, Andrew Navarro, scribed for Joby Gooden MD on 02/24/19 at 1833. 


Scribe Documentation Reviewed: Yes


Provider Attestation: 


The documentation as recorded by the scribe, Andrew Navarro accurately reflects 

the service I personally performed and the decisions made by me, Joby Gooden MD


Status of Scribe Document: Viewed

## 2019-05-31 ENCOUNTER — HOSPITAL ENCOUNTER (EMERGENCY)
Dept: HOSPITAL 25 - ED | Age: 29
LOS: 1 days | Discharge: HOME | End: 2019-06-01
Payer: COMMERCIAL

## 2019-05-31 DIAGNOSIS — S00.31XA: ICD-10-CM

## 2019-05-31 DIAGNOSIS — F32.9: Primary | ICD-10-CM

## 2019-05-31 DIAGNOSIS — S50.01XA: ICD-10-CM
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
## 2019-05-31 DIAGNOSIS — F19.10: ICD-10-CM
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
## 2019-05-31 PROCEDURE — 36415 COLL VENOUS BLD VENIPUNCTURE: CPT

## 2019-05-31 PROCEDURE — 80320 DRUG SCREEN QUANTALCOHOLS: CPT

## 2019-05-31 PROCEDURE — 84443 ASSAY THYROID STIM HORMONE: CPT

## 2019-05-31 PROCEDURE — 99285 EMERGENCY DEPT VISIT HI MDM: CPT

## 2019-05-31 PROCEDURE — G0480 DRUG TEST DEF 1-7 CLASSES: HCPCS

## 2019-05-31 PROCEDURE — 85025 COMPLETE CBC W/AUTO DIFF WBC: CPT

## 2019-05-31 PROCEDURE — 80053 COMPREHEN METABOLIC PANEL: CPT

## 2019-05-31 PROCEDURE — 80329 ANALGESICS NON-OPIOID 1 OR 2: CPT

## 2019-05-31 NOTE — ED
Psychiatric Complaint





- HPI Summary


HPI Summary: 


The patient is a 28 y/o M [facility] for 

violent episodes of aggression against police and hitting his head against the 

cage of the car tonight. Per police, the patient was brought it for aggressive 

behaviors. Once he was picked up, he continuously hit his head on the cage in 

the police car. In the ED, he is loud and verbal towards police and staff.





- History Of Current Complaint


Chief Complaint: EDMentalHealth


Time Seen by Provider: 05/31/19 23:41


Hx Obtained From: Patient, Other: - police


Onset/Duration: Lasting Minutes, Still Present


Timing: Minutes


Severity Initially: Mild


Severity Currently: Moderate


Character: Manic


Aggravating Factor(s): Nothing


Alleviating Factor(s): Nothing


Associated Signs And Symptoms: Positive: Hostile





- Allergies/Home Medications


Allergies/Adverse Reactions: 


 Allergies











Allergy/AdvReac Type Severity Reaction Status Date / Time


 


No Known Allergies Allergy   Verified 08/30/18 19:24














PMH/Surg Hx/FS Hx/Imm Hx


Endocrine/Hematology History: 


   Denies: Hx Anticoagulant Therapy, Hx Diabetes, Hx Thyroid Disease


Cardiovascular History: 


   Denies: Hx Hypertension


Respiratory History: 


   Denies: Hx Asthma, Hx Chronic Obstructive Pulmonary Disease (COPD)


GI History: 


   Denies: Hx Ulcer


 History: 


   Denies: Hx Dialysis


Neurological History: 


   Denies: Hx CVA


Psychiatric History: Reports: Hx Anxiety - hx of cutting, Hx Substance Abuse





- Cancer History


Cancer Type, Location and Year: anxiety





- Surgical History


Surgery Procedure, Year, and Place: right wrist





- Immunization History


Date of Tetanus Vaccine: 07/30/18


Infectious Disease History: No


Infectious Disease History: 


   Denies: Hx Clostridium Difficile, Hx Hepatitis, Hx Human Immunodeficiency 

Virus (HIV), Hx of Known/Suspected MRSA, Hx Shingles, Hx Tuberculosis, Hx Known/

Suspected VRE, Hx Known/Suspected VRSA, History Other Infectious Disease, 

Traveled Outside the [place] in Last 30 Days





- Family History


Known Family History: Positive: Hypertension


   Negative: Cardiac Disease, Diabetes





- Social History


Alcohol Use: Occasionally


Hx Substance Use: No


Substance Use Type: Reports: None


Substance Use Comment - Amount & Last Used: today


Hx Tobacco Use: No


Smoking Status (MU): Never Smoked Tobacco


Have You Smoked in the Last Year: No





Review of Systems


Neurological: Other - head hit on police cage


Psychological: Other - violent against others with agitation


All Other Systems Reviewed And Are Negative: Yes





Physical Exam





- Summary


Physical Exam Summary: 


Appearance: Well appearing, no pain distress


Skin: warm, dry, reflects adequate perfusion, Abrasion on nose, Right elbow 

contusion


Head/face: normal


Eyes: EOMI, ZEB


ENT: normal


Neck: supple, non-tender


Respiratory: CTA, breath sounds present


Cardiovascular: RRR, pulses symmetrical  


Abdomen: non-tender, soft


Musculoskeletal: normal, strength/ROM intact


Neuro: normal, sensory motor intact, A&Ox3


Triage Information Reviewed: Yes


Vital Signs On Initial Exam: 


 Initial Vitals











Temp Pulse Resp BP Pulse Ox


 


 98.9 F   89   18   147/96   94 


 


 05/31/19 23:31  05/31/19 23:31  05/31/19 23:31  05/31/19 23:31  05/31/19 23:31











Vital Signs Reviewed: Yes





Diagnostics





- Vital Signs


 Vital Signs











  Temp Pulse Resp BP Pulse Ox


 


 05/31/19 23:31  98.9 F  89  18  147/96  94














- Laboratory


Result Diagrams: 


 06/01/19 00:52





 06/01/19 00:52


Lab Statement: Any lab studies that have been ordered have been reviewed, and 

results considered in the medical decision making process.





Re-Evaluation





- Re-Evaluation


  ** First Eval


Re-Evaluation Time: 06:30


Comment: The patient refuses brain CT





Course/Dx





- Course


Assessment/Plan: The patient is a 28 y/o M presenting to [facility] brought in by 

police as 941 for violent episodes of aggression against police and hitting his 

head against the cage of the car tonight. Upon physical exam, the patient 

exhibits abrasions on the nose and contusion on the right elbow. In the ED 

course, the patient was administered Ativan. He did not want Ketamine 

administered because he doesnt like the way it makes him feel. Restraints were 

placed at 2345 by security and staff. They were removed. Blood work, UA, and 

toxicology reports obtained. He is diagnosed with depression and substance 

abuse. The patient is a sign-out to Dr. Dustin Tapia MD, from Dr. Misha Galvan MD at change of shift at 0700 pending MHE and disposition.





- Differential Dx/Clinical Impression


Provider Diagnosis: 


 Depression, Substance abuse








Discharge





- Sign-Out/Discharge


Documenting (check all that apply): Sign-Out Patient


Signing out patient TO: Dustin Tapia - Patient is a sign out to Dr. Tapia at 

change of shift pending MHE and disposition.


Patient Received Moderate/Deep Sedation with Procedure: Yes





- Discharge Plan


Condition: Stable


Patient Education Materials:  Moderate Sedation (ED)


Referrals: 


Benny Kc MD [Primary Care Provider] - 





- Billing Disposition and Condition


Condition: STABLE





- Attestation Statements


Document Initiated by Scribe: Yes


Documenting Scribe: Jemima Benitez


Provider For Whom Sara is Documenting (Include Credential): Dr. Misha Galvan MD


Scribe Attestation: 


Jemima HUMMEL scribed for Dr. Misha Galvan MD on 06/01/19 at 0651. 


Scribe Documentation Reviewed: Yes


Provider Attestation: 


The documentation as recorded by the Jemima downey accurately reflects 

the service I personally performed and the decisions made by me, Dr. Misha Galvan MD


Status of Scribe Document: Viewed

## 2019-06-01 VITALS — DIASTOLIC BLOOD PRESSURE: 94 MMHG | SYSTOLIC BLOOD PRESSURE: 136 MMHG

## 2019-06-01 LAB
ALBUMIN SERPL BCG-MCNC: 4.4 G/DL (ref 3.2–5.2)
ALBUMIN/GLOB SERPL: 1.5 {RATIO} (ref 1–3)
ALP SERPL-CCNC: 60 U/L (ref 34–104)
ALT SERPL W P-5'-P-CCNC: 15 U/L (ref 7–52)
ANION GAP SERPL CALC-SCNC: 10 MMOL/L (ref 2–11)
APAP SERPL-MCNC: < 15 MCG/ML
AST SERPL-CCNC: 19 U/L (ref 13–39)
BASOPHILS # BLD AUTO: 0 10^3/UL (ref 0–0.2)
BUN SERPL-MCNC: 12 MG/DL (ref 6–24)
BUN/CREAT SERPL: 11.8 (ref 8–20)
CALCIUM SERPL-MCNC: 8.9 MG/DL (ref 8.6–10.3)
CHLORIDE SERPL-SCNC: 107 MMOL/L (ref 101–111)
EOSINOPHIL # BLD AUTO: 0 10^3/UL (ref 0–0.6)
GLOBULIN SER CALC-MCNC: 2.9 G/DL (ref 2–4)
GLUCOSE SERPL-MCNC: 85 MG/DL (ref 70–100)
HCO3 SERPL-SCNC: 22 MMOL/L (ref 22–32)
HCT VFR BLD AUTO: 47 % (ref 42–52)
HGB BLD-MCNC: 16.4 G/DL (ref 14–18)
LYMPHOCYTES # BLD AUTO: 1.8 10^3/UL (ref 1–4.8)
MCH RBC QN AUTO: 31 PG (ref 27–31)
MCHC RBC AUTO-ENTMCNC: 35 G/DL (ref 31–36)
MCV RBC AUTO: 89 FL (ref 80–94)
MONOCYTES # BLD AUTO: 0.5 10^3/UL (ref 0–0.8)
NEUTROPHILS # BLD AUTO: 3.3 10^3/UL (ref 1.5–7.7)
NRBC # BLD AUTO: 0 10^3/UL
NRBC BLD QL AUTO: 0.1
PLATELET # BLD AUTO: 169 10^3/UL (ref 150–450)
POTASSIUM SERPL-SCNC: 3.5 MMOL/L (ref 3.5–5)
PROT SERPL-MCNC: 7.3 G/DL (ref 6.4–8.9)
RBC # BLD AUTO: 5.34 10^6 /UL (ref 4.18–5.48)
SALICYLATES SERPL-MCNC: < 2.5 MG/DL (ref ?–30)
SODIUM SERPL-SCNC: 139 MMOL/L (ref 135–145)
TSH SERPL-ACNC: 2.12 MCIU/ML (ref 0.34–5.6)
WBC # BLD AUTO: 5.7 10^3/UL (ref 3.5–10.8)

## 2019-06-01 NOTE — ED
Progress





- Progress Note


Progress Note: 





This pt was signed out by Dr. Galvan, pending disposition, awaiting mental 

health evaluation.





Pt had a mental health evaluation and his case was reviewed by Dr. Castaneda, 

psychiatrist. Dr. Castaneda has cleared the pt for discharge. Pt will be 

discharged home with dx of substance abuse disorder. 





Course/Dx





- Diagnoses


Provider Diagnoses: 


 Substance abuse








Discharge





- Sign-Out/Discharge


Documenting (check all that apply): Patient Departure - Discharge home, 

Receiving Sign-Out


Receiving patient FROM: Misha Galvan


Patient Received Moderate/Deep Sedation with Procedure: No





- Discharge Plan


Condition: Stable


Disposition: HOME


Patient Education Materials:  Moderate Sedation (ED)


Referrals: 


Benny Kc MD [Primary Care Provider] - 





- Attestation Statements


Document Initiated by Scribe: Yes


Documenting Scribe: Barb Brennan


Provider For Whom Scribe is Documenting (Include Credential): Dustin Tapia MD


Scribe Attestation: 


Barb HUMMEL, scribed for Dustin Tapia MD on 06/01/19 at 0742. 


Status of Scribe Document: Ready